# Patient Record
Sex: FEMALE | Race: WHITE | Employment: UNEMPLOYED | URBAN - METROPOLITAN AREA
[De-identification: names, ages, dates, MRNs, and addresses within clinical notes are randomized per-mention and may not be internally consistent; named-entity substitution may affect disease eponyms.]

---

## 2017-01-16 RX ORDER — ATORVASTATIN CALCIUM 20 MG/1
TABLET, FILM COATED ORAL
Qty: 90 TABLET | Refills: 1 | Status: ON HOLD | OUTPATIENT
Start: 2017-01-16 | End: 2020-01-01

## 2018-03-08 ENCOUNTER — HOSPITAL ENCOUNTER (OUTPATIENT)
Dept: SPEECH THERAPY | Age: 63
Setting detail: THERAPIES SERIES
Discharge: HOME | End: 2018-03-08

## 2020-01-01 ENCOUNTER — APPOINTMENT (OUTPATIENT)
Dept: CT IMAGING | Age: 65
End: 2020-01-01
Payer: MEDICARE

## 2020-01-01 ENCOUNTER — VIRTUAL VISIT (OUTPATIENT)
Dept: NEUROLOGY | Age: 65
End: 2020-01-01
Payer: MEDICARE

## 2020-01-01 ENCOUNTER — TELEPHONE (OUTPATIENT)
Dept: NEUROLOGY | Age: 65
End: 2020-01-01

## 2020-01-01 ENCOUNTER — APPOINTMENT (OUTPATIENT)
Dept: GENERAL RADIOLOGY | Age: 65
DRG: 100 | End: 2020-01-01
Payer: MEDICARE

## 2020-01-01 ENCOUNTER — OFFICE VISIT (OUTPATIENT)
Dept: NEUROLOGY | Age: 65
End: 2020-01-01
Payer: MEDICARE

## 2020-01-01 ENCOUNTER — HOSPITAL ENCOUNTER (EMERGENCY)
Age: 65
Discharge: HOME | End: 2020-10-06
Payer: MEDICARE

## 2020-01-01 ENCOUNTER — HOSPITAL ENCOUNTER (EMERGENCY)
Age: 65
Discharge: HOME | End: 2020-06-03
Payer: MEDICARE

## 2020-01-01 ENCOUNTER — HOSPITAL ENCOUNTER (INPATIENT)
Age: 65
LOS: 1 days | Discharge: HOME | DRG: 100 | End: 2020-03-10
Attending: INTERNAL MEDICINE | Admitting: INTERNAL MEDICINE
Payer: MEDICARE

## 2020-01-01 ENCOUNTER — APPOINTMENT (OUTPATIENT)
Dept: MRI IMAGING | Age: 65
DRG: 100 | End: 2020-01-01
Payer: MEDICARE

## 2020-01-01 ENCOUNTER — APPOINTMENT (OUTPATIENT)
Dept: CT IMAGING | Age: 65
DRG: 100 | End: 2020-01-01
Payer: MEDICARE

## 2020-01-01 VITALS
BODY MASS INDEX: 38.57 KG/M2 | DIASTOLIC BLOOD PRESSURE: 1 MMHG | TEMPERATURE: 97.5 F | HEIGHT: 66 IN | HEART RATE: 88 BPM | OXYGEN SATURATION: 98 % | RESPIRATION RATE: 17 BRPM | SYSTOLIC BLOOD PRESSURE: 168 MMHG | WEIGHT: 240 LBS

## 2020-01-01 VITALS
BODY MASS INDEX: 38.89 KG/M2 | OXYGEN SATURATION: 100 % | HEIGHT: 66 IN | WEIGHT: 242 LBS | RESPIRATION RATE: 18 BRPM | SYSTOLIC BLOOD PRESSURE: 125 MMHG | HEART RATE: 54 BPM | TEMPERATURE: 98.4 F | DIASTOLIC BLOOD PRESSURE: 86 MMHG

## 2020-01-01 VITALS — HEART RATE: 93 BPM | SYSTOLIC BLOOD PRESSURE: 141 MMHG | DIASTOLIC BLOOD PRESSURE: 116 MMHG

## 2020-01-01 VITALS
BODY MASS INDEX: 38.89 KG/M2 | HEART RATE: 69 BPM | OXYGEN SATURATION: 97 % | DIASTOLIC BLOOD PRESSURE: 68 MMHG | WEIGHT: 242 LBS | TEMPERATURE: 99.1 F | SYSTOLIC BLOOD PRESSURE: 134 MMHG | HEIGHT: 66 IN | RESPIRATION RATE: 16 BRPM

## 2020-01-01 LAB
ALBUMIN SERPL-MCNC: 3.7 G/DL (ref 3.5–4.6)
ALBUMIN SERPL-MCNC: 3.7 G/DL (ref 3.5–4.6)
ALBUMIN SERPL-MCNC: 4 G/DL (ref 3.5–4.6)
ALP BLD-CCNC: 77 U/L (ref 40–130)
ALP BLD-CCNC: 83 U/L (ref 40–130)
ALP BLD-CCNC: 83 U/L (ref 40–130)
ALT SERPL-CCNC: 12 U/L (ref 0–33)
ALT SERPL-CCNC: 15 U/L (ref 0–33)
ALT SERPL-CCNC: 15 U/L (ref 0–33)
ANION GAP SERPL CALCULATED.3IONS-SCNC: 11 MEQ/L (ref 9–15)
ANION GAP SERPL CALCULATED.3IONS-SCNC: 11 MEQ/L (ref 9–15)
ANION GAP SERPL CALCULATED.3IONS-SCNC: 13 MEQ/L (ref 9–15)
ANION GAP SERPL CALCULATED.3IONS-SCNC: 17 MEQ/L (ref 9–15)
AST SERPL-CCNC: 15 U/L (ref 0–35)
AST SERPL-CCNC: 16 U/L (ref 0–35)
AST SERPL-CCNC: 17 U/L (ref 0–35)
BACTERIA: NEGATIVE /HPF
BASOPHILS ABSOLUTE: 0 K/UL (ref 0–0.2)
BASOPHILS ABSOLUTE: 0.1 K/UL (ref 0–0.2)
BASOPHILS RELATIVE PERCENT: 0.4 %
BASOPHILS RELATIVE PERCENT: 0.6 %
BASOPHILS RELATIVE PERCENT: 0.8 %
BASOPHILS RELATIVE PERCENT: 1.3 %
BILIRUB SERPL-MCNC: 0.3 MG/DL (ref 0.2–0.7)
BILIRUB SERPL-MCNC: 0.3 MG/DL (ref 0.2–0.7)
BILIRUB SERPL-MCNC: 0.4 MG/DL (ref 0.2–0.7)
BILIRUBIN URINE: NEGATIVE
BLOOD, URINE: ABNORMAL
BUN BLDV-MCNC: 12 MG/DL (ref 8–23)
BUN BLDV-MCNC: 12 MG/DL (ref 8–23)
BUN BLDV-MCNC: 15 MG/DL (ref 8–23)
BUN BLDV-MCNC: 9 MG/DL (ref 8–23)
CALCIUM SERPL-MCNC: 8.3 MG/DL (ref 8.5–9.9)
CALCIUM SERPL-MCNC: 8.6 MG/DL (ref 8.5–9.9)
CALCIUM SERPL-MCNC: 8.7 MG/DL (ref 8.5–9.9)
CALCIUM SERPL-MCNC: 9.1 MG/DL (ref 8.5–9.9)
CHLORIDE BLD-SCNC: 102 MEQ/L (ref 95–107)
CHLORIDE BLD-SCNC: 105 MEQ/L (ref 95–107)
CHLORIDE BLD-SCNC: 107 MEQ/L (ref 95–107)
CHLORIDE BLD-SCNC: 108 MEQ/L (ref 95–107)
CLARITY: CLEAR
CO2: 18 MEQ/L (ref 20–31)
CO2: 20 MEQ/L (ref 20–31)
CO2: 23 MEQ/L (ref 20–31)
CO2: 26 MEQ/L (ref 20–31)
COLOR: YELLOW
CREAT SERPL-MCNC: 0.61 MG/DL (ref 0.5–0.9)
CREAT SERPL-MCNC: 0.71 MG/DL (ref 0.5–0.9)
CREAT SERPL-MCNC: 0.76 MG/DL (ref 0.5–0.9)
CREAT SERPL-MCNC: 0.78 MG/DL (ref 0.5–0.9)
EKG ATRIAL RATE: 70 BPM
EKG P AXIS: 19 DEGREES
EKG P-R INTERVAL: 172 MS
EKG Q-T INTERVAL: 434 MS
EKG QRS DURATION: 78 MS
EKG QTC CALCULATION (BAZETT): 468 MS
EKG R AXIS: 0 DEGREES
EKG T AXIS: 8 DEGREES
EKG VENTRICULAR RATE: 70 BPM
EOSINOPHILS ABSOLUTE: 0.1 K/UL (ref 0–0.7)
EOSINOPHILS RELATIVE PERCENT: 0.5 %
EOSINOPHILS RELATIVE PERCENT: 1.4 %
EOSINOPHILS RELATIVE PERCENT: 1.4 %
EOSINOPHILS RELATIVE PERCENT: 1.7 %
EPITHELIAL CELLS, UA: NORMAL /HPF (ref 0–5)
GFR AFRICAN AMERICAN: >60
GFR NON-AFRICAN AMERICAN: >60
GLOBULIN: 2.7 G/DL (ref 2.3–3.5)
GLOBULIN: 2.7 G/DL (ref 2.3–3.5)
GLOBULIN: 3 G/DL (ref 2.3–3.5)
GLUCOSE BLD-MCNC: 103 MG/DL (ref 70–99)
GLUCOSE BLD-MCNC: 117 MG/DL (ref 70–99)
GLUCOSE BLD-MCNC: 92 MG/DL (ref 70–99)
GLUCOSE BLD-MCNC: 96 MG/DL (ref 70–99)
GLUCOSE URINE: NEGATIVE MG/DL
HCT VFR BLD CALC: 36.4 % (ref 37–47)
HCT VFR BLD CALC: 43.4 % (ref 37–47)
HCT VFR BLD CALC: 43.6 % (ref 37–47)
HCT VFR BLD CALC: 44.6 % (ref 37–47)
HEMOGLOBIN: 12.4 G/DL (ref 12–16)
HEMOGLOBIN: 14.5 G/DL (ref 12–16)
HEMOGLOBIN: 14.7 G/DL (ref 12–16)
HEMOGLOBIN: 15 G/DL (ref 12–16)
KETONES, URINE: ABNORMAL MG/DL
LACTIC ACID: 2.2 MMOL/L (ref 0.5–2.2)
LACTIC ACID: 6.2 MMOL/L (ref 0.5–2.2)
LEUKOCYTE ESTERASE, URINE: NEGATIVE
LYMPHOCYTES ABSOLUTE: 1.5 K/UL (ref 1–4.8)
LYMPHOCYTES ABSOLUTE: 2 K/UL (ref 1–4.8)
LYMPHOCYTES ABSOLUTE: 2.2 K/UL (ref 1–4.8)
LYMPHOCYTES ABSOLUTE: 2.4 K/UL (ref 1–4.8)
LYMPHOCYTES RELATIVE PERCENT: 21 %
LYMPHOCYTES RELATIVE PERCENT: 22.3 %
LYMPHOCYTES RELATIVE PERCENT: 27.8 %
LYMPHOCYTES RELATIVE PERCENT: 34.4 %
MAGNESIUM: 2.1 MG/DL (ref 1.7–2.4)
MCH RBC QN AUTO: 30.4 PG (ref 27–31.3)
MCH RBC QN AUTO: 30.4 PG (ref 27–31.3)
MCH RBC QN AUTO: 31.4 PG (ref 27–31.3)
MCH RBC QN AUTO: 31.6 PG (ref 27–31.3)
MCHC RBC AUTO-ENTMCNC: 33.3 % (ref 33–37)
MCHC RBC AUTO-ENTMCNC: 33.6 % (ref 33–37)
MCHC RBC AUTO-ENTMCNC: 33.7 % (ref 33–37)
MCHC RBC AUTO-ENTMCNC: 34 % (ref 33–37)
MCV RBC AUTO: 90.2 FL (ref 82–100)
MCV RBC AUTO: 90.5 FL (ref 82–100)
MCV RBC AUTO: 93 FL (ref 82–100)
MCV RBC AUTO: 94.3 FL (ref 82–100)
MONOCYTES ABSOLUTE: 0.5 K/UL (ref 0.2–0.8)
MONOCYTES ABSOLUTE: 0.5 K/UL (ref 0.2–0.8)
MONOCYTES ABSOLUTE: 0.6 K/UL (ref 0.2–0.8)
MONOCYTES ABSOLUTE: 0.9 K/UL (ref 0.2–0.8)
MONOCYTES RELATIVE PERCENT: 6 %
MONOCYTES RELATIVE PERCENT: 8.1 %
MONOCYTES RELATIVE PERCENT: 9 %
MONOCYTES RELATIVE PERCENT: 9.2 %
MUCUS: PRESENT /LPF
NEUTROPHILS ABSOLUTE: 3.2 K/UL (ref 1.4–6.5)
NEUTROPHILS ABSOLUTE: 4.6 K/UL (ref 1.4–6.5)
NEUTROPHILS ABSOLUTE: 5.7 K/UL (ref 1.4–6.5)
NEUTROPHILS ABSOLUTE: 7.2 K/UL (ref 1.4–6.5)
NEUTROPHILS RELATIVE PERCENT: 54.5 %
NEUTROPHILS RELATIVE PERCENT: 64.4 %
NEUTROPHILS RELATIVE PERCENT: 66.3 %
NEUTROPHILS RELATIVE PERCENT: 68.9 %
NITRITE, URINE: NEGATIVE
PDW BLD-RTO: 12.6 % (ref 11.5–14.5)
PDW BLD-RTO: 13 % (ref 11.5–14.5)
PDW BLD-RTO: 13.2 % (ref 11.5–14.5)
PDW BLD-RTO: 13.7 % (ref 11.5–14.5)
PH UA: 5 (ref 5–9)
PLATELET # BLD: 216 K/UL (ref 130–400)
PLATELET # BLD: 258 K/UL (ref 130–400)
PLATELET # BLD: 259 K/UL (ref 130–400)
PLATELET # BLD: 283 K/UL (ref 130–400)
POTASSIUM SERPL-SCNC: 3.6 MEQ/L (ref 3.4–4.9)
POTASSIUM SERPL-SCNC: 3.7 MEQ/L (ref 3.4–4.9)
POTASSIUM SERPL-SCNC: 3.7 MEQ/L (ref 3.4–4.9)
POTASSIUM SERPL-SCNC: 4 MEQ/L (ref 3.4–4.9)
PROTEIN UA: ABNORMAL MG/DL
RBC # BLD: 3.91 M/UL (ref 4.2–5.4)
RBC # BLD: 4.61 M/UL (ref 4.2–5.4)
RBC # BLD: 4.83 M/UL (ref 4.2–5.4)
RBC # BLD: 4.93 M/UL (ref 4.2–5.4)
RBC UA: NORMAL /HPF (ref 0–5)
SODIUM BLD-SCNC: 139 MEQ/L (ref 135–144)
SODIUM BLD-SCNC: 139 MEQ/L (ref 135–144)
SODIUM BLD-SCNC: 140 MEQ/L (ref 135–144)
SODIUM BLD-SCNC: 143 MEQ/L (ref 135–144)
SPECIFIC GRAVITY UA: 1.02 (ref 1–1.03)
T4 FREE: 0.97 NG/DL (ref 0.84–1.68)
TOTAL CK: 76 U/L (ref 0–170)
TOTAL PROTEIN: 6.4 G/DL (ref 6.3–8)
TOTAL PROTEIN: 6.7 G/DL (ref 6.3–8)
TOTAL PROTEIN: 6.7 G/DL (ref 6.3–8)
TROPONIN: <0.01 NG/ML (ref 0–0.01)
TSH REFLEX: 8.27 UIU/ML (ref 0.44–3.86)
URINE CULTURE, ROUTINE: NORMAL
URINE REFLEX TO CULTURE: YES
UROBILINOGEN, URINE: 0.2 E.U./DL
WBC # BLD: 10.4 K/UL (ref 4.8–10.8)
WBC # BLD: 5.8 K/UL (ref 4.8–10.8)
WBC # BLD: 6.9 K/UL (ref 4.8–10.8)
WBC # BLD: 8.8 K/UL (ref 4.8–10.8)
WBC UA: NORMAL /HPF (ref 0–5)

## 2020-01-01 PROCEDURE — 93010 ELECTROCARDIOGRAM REPORT: CPT | Performed by: INTERNAL MEDICINE

## 2020-01-01 PROCEDURE — 99285 EMERGENCY DEPT VISIT HI MDM: CPT

## 2020-01-01 PROCEDURE — 93005 ELECTROCARDIOGRAM TRACING: CPT | Performed by: NURSE PRACTITIONER

## 2020-01-01 PROCEDURE — 96376 TX/PRO/DX INJ SAME DRUG ADON: CPT

## 2020-01-01 PROCEDURE — 1210000000 HC MED SURG R&B

## 2020-01-01 PROCEDURE — 96365 THER/PROPH/DIAG IV INF INIT: CPT

## 2020-01-01 PROCEDURE — 80053 COMPREHEN METABOLIC PANEL: CPT

## 2020-01-01 PROCEDURE — 6360000002 HC RX W HCPCS: Performed by: PSYCHIATRY & NEUROLOGY

## 2020-01-01 PROCEDURE — 85025 COMPLETE CBC W/AUTO DIFF WBC: CPT

## 2020-01-01 PROCEDURE — 95816 EEG AWAKE AND DROWSY: CPT | Performed by: PSYCHIATRY & NEUROLOGY

## 2020-01-01 PROCEDURE — 83605 ASSAY OF LACTIC ACID: CPT

## 2020-01-01 PROCEDURE — 97162 PT EVAL MOD COMPLEX 30 MIN: CPT

## 2020-01-01 PROCEDURE — 81001 URINALYSIS AUTO W/SCOPE: CPT

## 2020-01-01 PROCEDURE — 80048 BASIC METABOLIC PNL TOTAL CA: CPT

## 2020-01-01 PROCEDURE — 2580000003 HC RX 258: Performed by: INTERNAL MEDICINE

## 2020-01-01 PROCEDURE — 99233 SBSQ HOSP IP/OBS HIGH 50: CPT | Performed by: PSYCHIATRY & NEUROLOGY

## 2020-01-01 PROCEDURE — 84439 ASSAY OF FREE THYROXINE: CPT

## 2020-01-01 PROCEDURE — 84484 ASSAY OF TROPONIN QUANT: CPT

## 2020-01-01 PROCEDURE — 92610 EVALUATE SWALLOWING FUNCTION: CPT

## 2020-01-01 PROCEDURE — G0378 HOSPITAL OBSERVATION PER HR: HCPCS

## 2020-01-01 PROCEDURE — 36415 COLL VENOUS BLD VENIPUNCTURE: CPT

## 2020-01-01 PROCEDURE — 6370000000 HC RX 637 (ALT 250 FOR IP): Performed by: INTERNAL MEDICINE

## 2020-01-01 PROCEDURE — 2580000003 HC RX 258: Performed by: NURSE PRACTITIONER

## 2020-01-01 PROCEDURE — 99214 OFFICE O/P EST MOD 30 MIN: CPT | Performed by: NURSE PRACTITIONER

## 2020-01-01 PROCEDURE — 99282 EMERGENCY DEPT VISIT SF MDM: CPT

## 2020-01-01 PROCEDURE — 71045 X-RAY EXAM CHEST 1 VIEW: CPT

## 2020-01-01 PROCEDURE — 6360000002 HC RX W HCPCS: Performed by: NURSE PRACTITIONER

## 2020-01-01 PROCEDURE — 99214 OFFICE O/P EST MOD 30 MIN: CPT | Performed by: PSYCHIATRY & NEUROLOGY

## 2020-01-01 PROCEDURE — 70450 CT HEAD/BRAIN W/O DYE: CPT

## 2020-01-01 PROCEDURE — 96375 TX/PRO/DX INJ NEW DRUG ADDON: CPT

## 2020-01-01 PROCEDURE — 95816 EEG AWAKE AND DROWSY: CPT

## 2020-01-01 PROCEDURE — 97167 OT EVAL HIGH COMPLEX 60 MIN: CPT

## 2020-01-01 PROCEDURE — 6370000000 HC RX 637 (ALT 250 FOR IP): Performed by: PSYCHIATRY & NEUROLOGY

## 2020-01-01 PROCEDURE — 99222 1ST HOSP IP/OBS MODERATE 55: CPT | Performed by: PSYCHIATRY & NEUROLOGY

## 2020-01-01 PROCEDURE — 87086 URINE CULTURE/COLONY COUNT: CPT

## 2020-01-01 PROCEDURE — 99213 OFFICE O/P EST LOW 20 MIN: CPT | Performed by: NURSE PRACTITIONER

## 2020-01-01 PROCEDURE — 82550 ASSAY OF CK (CPK): CPT

## 2020-01-01 PROCEDURE — 96361 HYDRATE IV INFUSION ADD-ON: CPT

## 2020-01-01 PROCEDURE — 84443 ASSAY THYROID STIM HORMONE: CPT

## 2020-01-01 PROCEDURE — 6360000002 HC RX W HCPCS: Performed by: INTERNAL MEDICINE

## 2020-01-01 PROCEDURE — 96372 THER/PROPH/DIAG INJ SC/IM: CPT

## 2020-01-01 PROCEDURE — 83735 ASSAY OF MAGNESIUM: CPT

## 2020-01-01 RX ORDER — ACETAMINOPHEN 325 MG/1
650 TABLET ORAL EVERY 6 HOURS PRN
Status: DISCONTINUED | OUTPATIENT
Start: 2020-01-01 | End: 2020-01-01 | Stop reason: HOSPADM

## 2020-01-01 RX ORDER — PHENAZOPYRIDINE HYDROCHLORIDE 200 MG/1
200 TABLET, FILM COATED ORAL 3 TIMES DAILY PRN
Qty: 9 TABLET | Refills: 0 | Status: SHIPPED | OUTPATIENT
Start: 2020-01-01

## 2020-01-01 RX ORDER — LEVETIRACETAM 500 MG/1
500 TABLET ORAL 2 TIMES DAILY
Status: DISCONTINUED | OUTPATIENT
Start: 2020-01-01 | End: 2020-01-01 | Stop reason: HOSPADM

## 2020-01-01 RX ORDER — LEVETIRACETAM 500 MG/1
500 TABLET ORAL 2 TIMES DAILY
Qty: 60 TABLET | Refills: 1 | Status: SHIPPED | OUTPATIENT
Start: 2020-01-01 | End: 2020-01-01 | Stop reason: SDUPTHER

## 2020-01-01 RX ORDER — LORAZEPAM 2 MG/ML
1 INJECTION INTRAMUSCULAR EVERY 6 HOURS PRN
Status: DISCONTINUED | OUTPATIENT
Start: 2020-01-01 | End: 2020-01-01 | Stop reason: HOSPADM

## 2020-01-01 RX ORDER — HYDROXYZINE HYDROCHLORIDE 25 MG/1
25 TABLET, FILM COATED ORAL EVERY 6 HOURS PRN
Status: DISCONTINUED | OUTPATIENT
Start: 2020-01-01 | End: 2020-01-01 | Stop reason: HOSPADM

## 2020-01-01 RX ORDER — ACETAMINOPHEN 650 MG/1
650 SUPPOSITORY RECTAL EVERY 6 HOURS PRN
Status: DISCONTINUED | OUTPATIENT
Start: 2020-01-01 | End: 2020-01-01 | Stop reason: HOSPADM

## 2020-01-01 RX ORDER — SODIUM CHLORIDE 0.9 % (FLUSH) 0.9 %
10 SYRINGE (ML) INJECTION PRN
Status: DISCONTINUED | OUTPATIENT
Start: 2020-01-01 | End: 2020-01-01 | Stop reason: HOSPADM

## 2020-01-01 RX ORDER — LEVETIRACETAM 500 MG/1
500 TABLET ORAL 2 TIMES DAILY
Qty: 60 TABLET | Refills: 3 | Status: SHIPPED | OUTPATIENT
Start: 2020-01-01

## 2020-01-01 RX ORDER — SODIUM CHLORIDE 9 MG/ML
INJECTION, SOLUTION INTRAVENOUS CONTINUOUS
Status: DISCONTINUED | OUTPATIENT
Start: 2020-01-01 | End: 2020-01-01

## 2020-01-01 RX ORDER — LEVETIRACETAM 500 MG/1
500 TABLET ORAL 2 TIMES DAILY
Qty: 60 TABLET | Refills: 3 | Status: SHIPPED | OUTPATIENT
Start: 2020-01-01 | End: 2020-01-01 | Stop reason: SDUPTHER

## 2020-01-01 RX ORDER — POLYETHYLENE GLYCOL 3350 17 G/17G
17 POWDER, FOR SOLUTION ORAL DAILY PRN
Status: DISCONTINUED | OUTPATIENT
Start: 2020-01-01 | End: 2020-01-01 | Stop reason: HOSPADM

## 2020-01-01 RX ORDER — LABETALOL 20 MG/4 ML (5 MG/ML) INTRAVENOUS SYRINGE
10 EVERY 6 HOURS PRN
Status: DISCONTINUED | OUTPATIENT
Start: 2020-01-01 | End: 2020-01-01 | Stop reason: HOSPADM

## 2020-01-01 RX ORDER — DIVALPROEX SODIUM 125 MG/1
500 TABLET, DELAYED RELEASE ORAL 2 TIMES DAILY
Qty: 60 TABLET | Refills: 3 | Status: SHIPPED | OUTPATIENT
Start: 2020-01-01 | End: 2020-01-01

## 2020-01-01 RX ORDER — LORAZEPAM 2 MG/ML
2 INJECTION INTRAMUSCULAR EVERY 4 HOURS PRN
Status: DISCONTINUED | OUTPATIENT
Start: 2020-01-01 | End: 2020-01-01 | Stop reason: HOSPADM

## 2020-01-01 RX ORDER — LORAZEPAM 2 MG/ML
1 INJECTION INTRAMUSCULAR
Status: COMPLETED | OUTPATIENT
Start: 2020-01-01 | End: 2020-01-01

## 2020-01-01 RX ORDER — NYSTATIN 100000 [USP'U]/G
POWDER TOPICAL
Qty: 1 BOTTLE | Refills: 0 | Status: SHIPPED | OUTPATIENT
Start: 2020-01-01

## 2020-01-01 RX ORDER — SODIUM CHLORIDE 0.9 % (FLUSH) 0.9 %
10 SYRINGE (ML) INJECTION EVERY 12 HOURS SCHEDULED
Status: DISCONTINUED | OUTPATIENT
Start: 2020-01-01 | End: 2020-01-01 | Stop reason: HOSPADM

## 2020-01-01 RX ORDER — SULFAMETHOXAZOLE AND TRIMETHOPRIM 800; 160 MG/1; MG/1
1 TABLET ORAL 2 TIMES DAILY
Qty: 14 TABLET | Refills: 0 | Status: SHIPPED | OUTPATIENT
Start: 2020-01-01 | End: 2020-01-01

## 2020-01-01 RX ORDER — POLYETHYLENE GLYCOL 3350 17 G/17G
17 POWDER, FOR SOLUTION ORAL DAILY
COMMUNITY

## 2020-01-01 RX ORDER — OXCARBAZEPINE 300 MG/1
300 TABLET, FILM COATED ORAL 2 TIMES DAILY
Qty: 60 TABLET | Refills: 3 | Status: SHIPPED | OUTPATIENT
Start: 2020-01-01 | End: 2020-01-01

## 2020-01-01 RX ORDER — DIPHENHYDRAMINE HCL 25 MG
25 TABLET ORAL ONCE
Status: COMPLETED | OUTPATIENT
Start: 2020-01-01 | End: 2020-01-01

## 2020-01-01 RX ADMIN — LEVETIRACETAM 500 MG: 100 INJECTION, SOLUTION INTRAVENOUS at 23:17

## 2020-01-01 RX ADMIN — LEVETIRACETAM 500 MG: 500 TABLET ORAL at 10:48

## 2020-01-01 RX ADMIN — DIPHENHYDRAMINE HCL 25 MG: 25 TABLET ORAL at 23:17

## 2020-01-01 RX ADMIN — LEVETIRACETAM 1000 MG: 100 INJECTION, SOLUTION INTRAVENOUS at 11:47

## 2020-01-01 RX ADMIN — ENOXAPARIN SODIUM 40 MG: 40 INJECTION SUBCUTANEOUS at 16:23

## 2020-01-01 RX ADMIN — LORAZEPAM 1 MG: 2 INJECTION INTRAMUSCULAR; INTRAVENOUS at 20:03

## 2020-01-01 RX ADMIN — Medication 10 ML: at 10:49

## 2020-01-01 RX ADMIN — SODIUM CHLORIDE 75 ML/HR: 9 INJECTION, SOLUTION INTRAVENOUS at 16:23

## 2020-01-01 RX ADMIN — SODIUM CHLORIDE: 9 INJECTION, SOLUTION INTRAVENOUS at 06:19

## 2020-01-01 ASSESSMENT — ENCOUNTER SYMPTOMS
COLOR CHANGE: 0
TROUBLE SWALLOWING: 0
WHEEZING: 0
WHEEZING: 0
CHOKING: 0
TROUBLE SWALLOWING: 0
RHINORRHEA: 0
SHORTNESS OF BREATH: 0
TROUBLE SWALLOWING: 0
VOMITING: 0
DIARRHEA: 0
COLOR CHANGE: 0
SORE THROAT: 0
COUGH: 0
COUGH: 0
NAUSEA: 0
CONSTIPATION: 0
EYE REDNESS: 0
COUGH: 0
PHOTOPHOBIA: 0
COLOR CHANGE: 0
WHEEZING: 0
TROUBLE SWALLOWING: 0
BACK PAIN: 0
COUGH: 0
EYE PAIN: 0
EYE DISCHARGE: 0
BLOOD IN STOOL: 0
SHORTNESS OF BREATH: 0
VOMITING: 0
BACK PAIN: 0
NAUSEA: 0
VOMITING: 0
ABDOMINAL PAIN: 0
WHEEZING: 0

## 2020-01-01 ASSESSMENT — PAIN SCALES - WONG BAKER: WONGBAKER_NUMERICALRESPONSE: 4

## 2020-01-01 ASSESSMENT — PAIN SCALES - GENERAL
PAINLEVEL_OUTOF10: 0
PAINLEVEL_OUTOF10: 0

## 2020-03-09 PROBLEM — R56.9 SEIZURE (HCC): Status: ACTIVE | Noted: 2020-01-01

## 2020-03-09 NOTE — CARE COORDINATION
[facility] Case Management Initial Discharge Assessment    Met with Family to discuss discharge plan. PCP: WARREN Adames NP                                Date of Last Visit: 1 month ago    If no PCP, list provided? N/A    Discharge Planning    Living Arrangements: at home dependent on family care    Who do you live with?     Who helps you with your care:  spouse    If lives at home:     Do you have any barriers navigating in your home? no    Patient can perform ADL? No    Current Services (outpatient and in home) :  None    Dialysis: No    Is transportation available to get to your appointments? Yes    DME Equipment:  no    Respiratory equipment: None    Respiratory provider:  no     [facility]    Consult with Medication Assistance Program?  No        Does Patient Have a High-Risk for Readmission Diagnosis (CHF, PN, MI, COPD)? no      Initial Discharge Plan? (Note: please see concurrent daily documentation for any updates after initial note). CM to follow for further d/c needs and referrals.     Electronically signed by Aiden Lieberman on 3/9/2020 at 1:43 PM

## 2020-03-09 NOTE — ED NOTES
Pt becoming more responsive per  pt nodding more appropriately. Pt appears more alert since arrival.     Caitlin Mena.  Vida Felt  03/09/20 3309

## 2020-03-09 NOTE — ED NOTES
Bed: 20  Expected date: 3/9/20  Expected time: 10:49 AM  Means of arrival: [facility]  Comments:  60 F - possible BAT. Unresponsive per family. Now responding. Hx dementia and TIAs. 220/134, 86.  OT 1100 [facility], RN  03/09/20 0504

## 2020-03-09 NOTE — ED TRIAGE NOTES
Pt arrived via ems from home with complaint of altered mental status. On arrival pt was awake non verbal hx of dementia. Pt with dried blood on left side of face from mouth. Pt does not follow commands. Peer ems pt was watching tv and let out a loud scream and would not respond to . Per , after scream pt stiffened with arms outstretched which lasted just a few seconds. Per , pt has limited speech. Pt incontinent of urine.  per ems.

## 2020-03-09 NOTE — ED PROVIDER NOTES
Occupational History    None   Social Needs    Financial resource strain: None    Food insecurity     Worry: None     Inability: None    Transportation needs     Medical: None     Non-medical: None   Tobacco Use    Smoking status: Never Smoker   Substance and Sexual Activity    Alcohol use: No     Alcohol/week: 0.0 standard drinks    Drug use: None    Sexual activity: None   Lifestyle    Physical activity     Days per week: None     Minutes per session: None    Stress: None   Relationships    Social connections     Talks on phone: None     Gets together: None     Attends Mandaen service: None     Active member of club or organization: None     Attends meetings of clubs or organizations: None     Relationship status: None    Intimate partner violence     Fear of current or ex partner: None     Emotionally abused: None     Physically abused: None     Forced sexual activity: None   Other Topics Concern    None   Social History Narrative    None       SCREENINGS   NIH Stroke Scale  Level of Consciousness (1a. ): Alert  LOC Questions (1b. ): (same as arrival)  LOC Commands (1c. ): (same as arrival)  Best Gaze (2. ): Normal  Visual (3. ): (unable to assess)  Facial Palsy (4. ): Normal symmetrical movement  Motor Arm, Left (5a. ): Some effort against gravity  Motor Arm, Right (5b. ): Some effort against gravity  Motor Leg, Left (6a. ): Some effort against gravity  Motor Leg, Right (6b. ): Some effort against gravity  Limb Ataxia (7. ): (unable to assess)  Sensory (8. ): (unable to assess)  Best Language (9. ): (pt says \"yeah\"now)  Dysarthria (10. ): (unable to assess)  Extinction and Inattention (11): (pt unable to follow)[place] Coma Scale  Eye Opening: Spontaneous  Best Verbal Response: None(pt non verbal)  Best Motor Response: Localizes pain  [place] Coma Scale Score: 10        PHYSICAL EXAM    (up to 7 for level 4, 8 or more for level 5)     ED Triage Vitals   BP Temp Temp src Pulse Resp SpO2 Height She does not appear toxic or distressed. Does not follow commands but moves from painful stimulus. Exam and hx concerning for seizure type activity. NIH difficult to evaluate due to pt nonverbal with hx of same and not following commands. Will obtain labs/rads for evaluation of acute pathology. 1109- reexamined. Now with some attempts to verbally communicate. Cannot understand her but  reports that she is back to baseline, again he notes severe dementia. Return to baseline. Labs/rads noted. Discussed case with neuro, ok with keppra. Needs admit to evaluate seizure activity. Will d/w hospitalist.     CRITICAL CARE TIME         CONSULTS:  IP CONSULT TO HOSPITALIST  IP CONSULT TO NEUROLOGY    PROCEDURES:  Unless otherwise noted below, none     Procedures    FINAL IMPRESSION      1. Seizure ([address]          DISPOSITION/PLAN   DISPOSITION        PATIENT REFERRED TO:  No follow-up provider specified.     DISCHARGE MEDICATIONS:  New Prescriptions    No medications on file          (Please notethat portions of this note were completed with a voice recognition program.  Efforts were made to edit the dictations but occasionally words are mis-transcribed.)    WARREN Mistry CNP (electronically signed)  Attending Emergency Physician          WARREN Mistry CNP  03/09/20 8568

## 2020-03-09 NOTE — ED NOTES
Pt to CT via ED tech after blood draw. Deepak Major [facility]  03/09/20 111

## 2020-03-09 NOTE — H&P
Hospital Medicine  History and Physical    Patient:  Gio Menchaca  MRN: [id number]    CHIEF COMPLAINT:    Chief Complaint   Patient presents with    Altered Mental Status     since about 1015       History Obtained From:  Patient, EMR  Primary Care Physician: WARREN Greenberg - TIFFANIE    HISTORY OF PRESENT ILLNESS:   The patient is a 60 y.o. female with PMH of severe dementia, history of multiple CVAs, depression who presents to ED with altered mental status. Patient is nonverbal at baseline and therefore the history is provided by her  who is at bedside. Patient apparently was watching TV this morning when  said she began to stretch and then made a funny noise. She then began screaming and her muscles \"froze,\" and she was shaking with arms above her head. Per , this has never happened before. After 1-2 minutes of this she became limp and  was unable to awaken her. EMS was called. Patient had urinated on herself and bit her tongue. En route to ED, she began to return to her baseline, which is still minimally interactive. She was loaded with [employer]ra and neurology was contacted. She will be admitted to [facility]. Past Medical History:      Diagnosis Date    Dementia ([facility] [address]     Depression     History of TIAs 2013    Hyperlipidemia        Past Surgical History:  History reviewed. No pertinent surgical history. Medications Prior to Admission:    Prior to Admission medications    Medication Sig Start Date End Date Taking? Authorizing Provider   atorvastatin (LIPITOR) 20 MG tablet Take 1 tablet by mouth daily 1/16/17   Humberto Wright MD   NAMENDA XR 14 MG CP24 capsule  12/14/15   Historical Provider, MD   sertraline (ZOLOFT) 25 MG tablet  12/4/15   Historical Provider, MD   Multiple Vitamins-Minerals (MULTIVITAMIN GUMMIES ADULT PO) Take by mouth    Historical Provider, MD       Allergies:  Radha Stratton hcl]    Social History:   TOBACCO:   reports that she has never smoked.  She does not have any smokeless tobacco history on file. ETOH:   reports no history of alcohol use. Family History:       Family history unknown: Yes       REVIEW OF SYSTEMS:  Ten systems reviewed and negative except for stated in HPI    Physical Exam:    Vitals: /85   Pulse 55   Temp 97.3 °F (36.3 °C) (Temporal)   Resp 23   Ht 5' 6\" (1.676 m)   Wt 242 lb (109.8 kg)   LMP  (LMP Unknown)   SpO2 93%   BMI 39.06 kg/m²   General appearance: alert, appears stated age and cooperative, moderate acute distress  Skin: Skin color, texture, turgor normal. No rashes or lesions  HEENT: Head: Normal, normocephalic, atraumatic. Neck: no carotid bruit and supple, symmetrical, trachea midline  Lungs: clear to auscultation bilaterally  Heart: regular rate and rhythm, S1, S2 normal, no murmur, click, rub or gallop  Abdomen: soft, non-tender; bowel sounds normal; no masses,  no organomegaly  Extremities: extremities normal, atraumatic, no cyanosis or edema  Neurologic: Mental status: Alert. Does not follow commands (baseline)/ cooperate with neurologic exam.     Recent Labs     03/09/20  1112   WBC 8.8   HGB 14.5        Recent Labs     03/09/20  1112      K 4.0   *   CO2 18*   BUN 12   CREATININE 0.71   GLUCOSE 117*   AST 17   ALT 15   BILITOT 0.3   ALKPHOS 77     Troponin T:   Recent Labs     03/09/20  1112   TROPONINI <0.010       ABGs: No results found for: PHART, PO2ART, AOI4EZR  INR: No results for input(s): INR in the last 72 hours.   URINALYSIS:  Recent Labs     03/09/20  1100 03/09/20  1242   COLORU Yellow  --    PHUR 5.0  --    WBCUA  --  0-2   RBCUA  --  0-2   MUCUS  --  Present   BACTERIA  --  Negative   CLARITYU Clear  --    SPECGRAV 1.023  --    LEUKOCYTESUR Negative  --    UROBILINOGEN 0.2  --    BILIRUBINUR Negative  --    BLOODU TRACE*  --    GLUCOSEU Negative  --      -----------------------------------------------------------------   Ct Head Wo Contrast    Result Date: 3/9/2020  CT HEAD WO CONTRAST CLINICAL HISTORY: Change in mental status COMPARISON: None TECHNIQUE: Multiple unenhanced serial axial images of the brain from the vertex of the skull to the base of the skull were performed. FINDINGS: The ventricles are dilated. This is compensatory to the surrounding moderate generalized parenchymal volume loss. No mass. No midline shift. The cisterns are patent. There are white matter and periventricular changes most likely consistent with chronic small vessel disease. No acute intra-axial or extra-axial findings. The visualized osseous structures are unremarkable. There is mucoperiosteal thickening of the right maxillary sinus. No air-fluid levels. The mastoids are well aerated. NO ACUTE INTRA-AXIAL OR EXTRA-AXIAL FINDINGS. All CT scans at this facility use dose modulation, iterative reconstruction, and/or weight based dosing when appropriate to reduce radiation dose to as low as reasonably achievable. Assessment and Plan     1. Seizure: High suspicion for tonic clonic activity based on 's description. CT brain negative for acute process. 2. History of stroke/TIA  3. History of severe dementia     Plan:  - Consult to neurology  - Keppra loading per ED  - Continue IV Keppra BID  - EEG pending  - Ativan prn for additional tonic-clonic activity  - DNR-CCA per family   - Delirium protcol  - Gentle IVF  - Monitor on telemetry      Patient Active Problem List   Diagnosis Code    Hyperlipidemia E78.5    History of TIAs Z86.73    Seizure ([facility] [address] R56.9       Ml Cardenas DO  Admitting Hospitalist    TTS: 85mins where I focused more than 75% of my attention on rendering care, and planning treatment course for this patient, in addition to talking to RN team, mid levels, consulting with other physicians and following up on labs and imaging. High Risk Readmission Screening Tool Score Noted.      Emergency Contact:

## 2020-03-10 NOTE — PROGRESS NOTES
formally assess due to lack of pt compliance, however, RN notes that pt eats all foods at home per .)  Patient Positioning: Upright in bed  Baseline Vocal Quality: Normal(Limited speech output secondary to dementia and nonverbal status.)  Consistencies Administered: Reg solid; Thin - cup; Thin - straw(Pt refused puree consistencies.)    Current Diet level:  Current Diet : (Dental soft diet.)  Current Liquid Diet : Thin    Oral Motor Deficits  Oral/Motor  Oral Motor: (Unable to formally assess secondary to pt not following commands.)    Oral Phase Dysfunction  Oral Phase  Oral Phase: WFL  Oral Phase  Oral Phase - Comment: Oral phase of swallow WFL. Pt was able to form and clear a cohesive bolus. Limited trials were administered secondary to pt refusal and participation. Nurse reports that pt eats any foods she wants at home. Unable to assess for buccal pocketing and residue secondary to pt not following directives to \"open mouth\". No evidence of oral holding or impaired mastication was observed this date. Indicators of Pharyngeal Phase Dysfunction   Pharyngeal Phase  Pharyngeal Phase: WFL  Pharyngeal Phase   Pharyngeal: Pharyngeal phase of swallow WFL. Pt able to tolerate trials of thin liquids (cup and straw), and regular solid without any overt s/s of aspiration observed. Laryngeal elevation was present during examination. Impression  Dysphagia Diagnosis: Suspected needs further assessment;Swallow function appears grossly intact  Dysphagia Impression : At this time, pt swallow function appears [facility], however, further assessment is needed as pt is more cooperative with PO trials. Dysphagia Outcome Severity Scale: Level 6: Within functional limits/Modified independence     Treatment Plan  Requires SLP Intervention: Yes  Duration/Frequency of Treatment: 1x/wk for LOS or until all goals met.         Treatment/Goals  Short-term Goals  Timeframe for Short-term Goals: 1-2 weeks  Goal 1: Pt will tolerate soft and bite size consistencies with thin liquids in all given opportunities without adverse outcomes. Goal 2: Family will be educated on safe swallow strategies (small bites/sips, upright position, feed slowly) in order to decrease risk of aspiration. Long-term Goals  Timeframe for Long-term Goals: 1-2 weeks  Goal 1: Pt will tolerate recommended diet with no adverse outcomes. Prognosis  Prognosis  Prognosis for safe diet advancement: fair  Barriers to reach goals: age;cognitive deficits;language deficits  Barriers/Prognosis Comment: Pt has dementia. Individuals consulted  Consulted and agree with results and recommendations: RN(RN - Kate Matos)    Education  Patient Education: No education provided at this time.   Safety Devices in place: Yes  Type of devices: Call light within reach;Nurse notified    Pain:  Pain Assessment  Patient Currently in Pain: (Pt unable to state if she has pain.)       Therapy Time  SLP Individual Minutes  Time In: 1110  Time Out: 1120  Minutes: 10          Signature: Electronically signed by Krystina Esquivel SLP on 3/10/2020 at 11:42 AM

## 2020-03-10 NOTE — DISCHARGE SUMMARY
are mild degenerative changes in spine. Impression CARDIAC ENLARGEMENT WITH MILD CENTRAL VASCULAR CONGESTION. LEFT-SIDED PLEURAL EFFUSION. BIBASILAR ATELECTASIS. Echo No results found for this or any previous visit. Disposition: home    In process/preliminary results:  Outstanding Order Results     Date and Time Order Name Status Description    3/9/2020 1242 Culture, Urine Preliminary           Patient Instructions:   Current Discharge Medication List      START taking these medications    Details   levETIRAcetam (KEPPRA) 500 MG tablet Take 1 tablet by mouth 2 times daily  Qty: 60 tablet, Refills: 1      nystatin (MYCOSTATIN) 884228 UNIT/GM powder Apply 3 times daily. Qty: 1 Bottle, Refills: 0         CONTINUE these medications which have NOT CHANGED    Details   aspirin 81 MG tablet Take 81 mg by mouth daily      polyethylene glycol (GLYCOLAX) packet Take 17 g by mouth daily         STOP taking these medications       atorvastatin (LIPITOR) 20 MG tablet Comments:   Reason for Stopping:         NAMENDA XR 14 MG CP24 capsule Comments:   Reason for Stopping:         sertraline (ZOLOFT) 25 MG tablet Comments:   Reason for Stopping:         Multiple Vitamins-Minerals (MULTIVITAMIN GUMMIES ADULT PO) Comments:   Reason for Stopping:             Activity: As tolerated      Follow-up with PCP in 1 week. Follow-up with neurology in the next 2 to 4 weeks.     DC time 35 minutes    Signed:  Electronically signed by Deandre Michaels DO on 3/10/2020 at 11:25 AM

## 2020-03-10 NOTE — FLOWSHEET NOTE
At about 2230, perfect served the hospitalist regarding patient scratching at her chest and abdomen. Small scattered red dots noted on patient's chest. New order received for benadryl, patient received at about 2330 and it was ineffective. Also notified the hospitalist at about 2801 [facility] regarding the patient's elevated BP's. New order IV hydralazine received. Continue to monitor.

## 2020-03-10 NOTE — FLOWSHEET NOTE
Returned from MRI-unable to complete test, patient hit at self and trying to sit up in machine. Hospitalist notified at 2030. Assist of 2 to transfer back to bed, patient hitting at self, slapping at this nurse when attempting to apply tele. Bed alarm place on. Continue to monitor.

## 2020-03-10 NOTE — PROGRESS NOTES
[facility] OCCUPATIONAL THERAPY EVALUATION - ACUTE     NAME: Na Butcher  : 1955 (60 y.o.)  MRN: [id number]  CODE STATUS: DNR-CCA  Room: N227/N227-01    Date of Service: 3/10/2020    Patient Diagnosis(es): Seizure [facility]) [R56.9]   Chief Complaint   Patient presents with    Altered Mental Status     since about 21      Patient Active Problem List    Diagnosis Date Noted    Seizure [facility]) 2020    Hyperlipidemia     History of TIAs         Past Medical History:   Diagnosis Date    Dementia ([facility] [address]     Depression     History of TIAs     Hyperlipidemia      History reviewed. No pertinent surgical history. Restrictions  Restrictions/Precautions: Fall Risk     Safety Devices: Safety Devices  Safety Devices in place: Yes  Type of devices: All fall risk precautions in place    Subjective  Pre Treatment Pain Screening  Pain at present: 4  Scale Used: Faces  Intervention List: Patient able to continue with treatment    Pain Reassessment:   Pain Assessment  Patient Currently in Pain: Yes(Indicates pain with movement of either UE)  Pain Assessment: Faces  Bautista-Baker Pain Rating: Hurts little more       Prior Level of Function:  Social/Functional History  Lives With: Spouse  Type of Home: House  Home Layout: One level  Home Access: Level entry  Bathroom Shower/Tub: Tub/Shower unit  ADL Assistance: Needs assistance(Supervision from spouse)  Homemaking Assistance: Needs assistance  Ambulation Assistance: Needs assistance(Supervision from spouse, no AD)  Transfer Assistance: Needs assistance  Active : No  Patient's  Info: Spouse  Additional Comments: Spouse is at home at all times, provides assist PRN.  Pt. non-verbal, spouse provides info    OBJECTIVE:     Orientation Status:  Orientation  Overall Orientation Status: (Unable to assess)    Observation:  Observation/Palpation  Posture: Fair  Observation: Pt. up in wheelchair on therapist arrival. Pt. smiles pleasantly but does not attend to

## 2020-03-10 NOTE — PROGRESS NOTES
Physical Therapy Med Surg Initial Assessment  [facility]  Room: N227/N227-01       NAME: Daniel Mars  : 1955 (23 y.o.)  MRN: [id number]  CODE STATUS: DNR-CCA    Date of Service: 3/10/2020    Patient Diagnosis(es): Seizure [facility]) [R56.9]   Chief Complaint   Patient presents with    Altered Mental Status     since about 21      Patient Active Problem List    Diagnosis Date Noted    Seizure ([facility] [address] 2020    Hyperlipidemia     History of TIAs         Past Medical History:   Diagnosis Date    Dementia ([facility] [address]     Depression     History of TIAs     Hyperlipidemia      History reviewed. No pertinent surgical history. Chart Reviewed: Yes  Patient assessed for rehabilitation services?: Yes    Restrictions:  Restrictions/Precautions: Fall Risk     SUBJECTIVE:      Pain  Pre Treatment Pain Screening  Comments / Details: apparent no pain at rest    Post Treatment Pain Screening:   Pain Screening  Patient Currently in Pain: Yes(indicates pain with movement of her both UEs)  Pain Assessment  Pain Assessment: Faces    Prior Level of Function:  Social/Functional History  Lives With: Spouse  Type of Home: House  Home Layout: One level  Home Access: Level entry  Bathroom Shower/Tub: Tub/Shower unit  ADL Assistance: Needs assistance(Supervision from spouse)  Homemaking Assistance: Needs assistance  Ambulation Assistance: Needs assistance(Supervision from spouse, no AD)  Transfer Assistance: Needs assistance  Active : No  Patient's  Info: Spouse  Additional Comments: Spouse is at home at all times, provides assist PRN.  Pt. non-verbal, spouse provides info    OBJECTIVE:        Cognition:  Follows Commands: Impaired(follows tactile commands <25% of time, no response with verbal or visual cues)         Strength:  Strength RLE  Comment: grossly 4-/5  Strength LLE  Comment: grossly 4-/5         Bed mobility  Comment: DNT, pt dressed in her shoes and coat, sitting up in [facility] awaiting transport home upon PT arrival    Transfers  Sit to Stand: Moderate Assistance;2 Person Assistance(pt only able to stand with spouse providing max encouragement, 2 person assist to stand)  Stand to sit: Moderate Assistance;2 Person Assistance  Bed to Chair: Unable to assess(did not follow directions to complete a pivot transfer)  Comment: spouse attempted pulling pt from the lapels of her coat to stand- was educated in appropriate transfer technique using tactile and visual cueing to reduce caregiver burden and to improve pt safety. all education disregarded as spouse insisting his car was waiting and pt had to leave the floor    Ambulation  Ambulation?: Yes  Ambulation 1  Surface: level tile  Device: No Device  Assistance: Minimal assistance  Quality of Gait: staggering gait, poor postural control  Distance: takes about 5 steps before trying to sit back down in [facility], encouraged to try ambulating further with no success                              ASSESSMENT:   Body structures, Functions, Activity limitations: Decreased balance;Decreased functional mobility ; Decreased endurance;Decreased strength  Decision Making: Medium Complexity  History: high  Exam: medium  Clinical Presentation: medium  No Skilled PT: Patient refusal(would rec continued PT but pt already leaving hospital without ability to further educate or make recommendations)    Prognosis: Good  Barriers to Learning: unable to follow commands consistently, advanced dementia    DISCHARGE RECOMMENDATIONS:  No Skilled PT: Patient refusal(would rec continued PT but pt already leaving hospital without ability to further educate or make recommendations)    Assessment: Pt at risk for falls, demonstrates above deficits. Unable to make further recommendations as spouse already taking patient to car.      REQUIRES PT FOLLOW UP: No      PLAN OF CARE:  Safety Devices  Type of devices: (handoff of care to PCA and spouse to transport)    Goals:  Long term goals  Long term

## 2020-03-10 NOTE — CONSULTS
Subjective:      Patient ID: Prabhakar Avilez is a 60 y.o. female who presents today for:  Chief Complaint   Patient presents with    Altered Mental Status     since about 38       HPI 30-year-old right-handed female with a known history of vascular dementia since about 5 years. Patient was seen by us in October 2018. She has home care with her . She has significant sleep issues and depression. Patient was admitted with a generalized seizure. 2 days prior to this at night patient's  had her screech but he did not notice anything different though with this episode patient went into a tonic seizure. Lasting for maybe 3 to 5 minutes. Patient had a reaction to Namenda and we had recommended continuation of galantamine. She has multiple vascular issues but no risk factors for cerebrovascular disease have been identified she has been worked up in the past with this diagnosis. Patient was seen here with her  and she appeared to be improving and reaching baseline. She is able to eat she does not choke. She still walks around in the house but she is total care otherwise. Review of system is therefore not possible. Review of Systems   Unable to perform ROS: Dementia   Constitutional: Negative for fever. HENT: Negative for ear pain, tinnitus and trouble swallowing. Eyes: Negative for photophobia and visual disturbance. Respiratory: Negative for choking and shortness of breath. Cardiovascular: Negative for chest pain and palpitations. Gastrointestinal: Negative for nausea and vomiting. Musculoskeletal: Negative for back pain, gait problem, joint swelling, myalgias, neck pain and neck stiffness. Neurological: Negative for dizziness, tremors, seizures, syncope, facial asymmetry, speech difficulty, weakness, light-headedness, numbness and headaches. Psychiatric/Behavioral: Negative for behavioral problems, confusion, hallucinations and sleep disturbance.        Past Medical History:   Diagnosis Date    Dementia ([facility] [address]     Depression     History of TIAs 2013    Hyperlipidemia      History reviewed. No pertinent surgical history. Social History     Socioeconomic History    Marital status:      Spouse name: Not on file    Number of children: Not on file    Years of education: Not on file    Highest education level: Not on file   Occupational History    Not on file   Social Needs    Financial resource strain: Not on file    Food insecurity     Worry: Not on file     Inability: Not on file    Transportation needs     Medical: Not on file     Non-medical: Not on file   Tobacco Use    Smoking status: Current Every Day Smoker     Years: 46.00    Smokeless tobacco: Never Used   Substance and Sexual Activity    Alcohol use: No     Alcohol/week: 0.0 standard drinks    Drug use: Never    Sexual activity: Not on file   Lifestyle    Physical activity     Days per week: Not on file     Minutes per session: Not on file    Stress: Not on file   Relationships    Social connections     Talks on phone: Not on file     Gets together: Not on file     Attends Nondenominational service: Not on file     Active member of club or organization: Not on file     Attends meetings of clubs or organizations: Not on file     Relationship status: Not on file    Intimate partner violence     Fear of current or ex partner: Not on file     Emotionally abused: Not on file     Physically abused: Not on file     Forced sexual activity: Not on file   Other Topics Concern    Not on file   Social History Narrative    Not on file     Family History   Family history unknown:  Yes     Allergies   Allergen Reactions    Namenda [Memantine Hcl]      Hands turned purple and brown     Current Facility-Administered Medications   Medication Dose Route Frequency Provider Last Rate Last Dose    labetalol (NORMODYNE;TRANDATE) injection syringe 10 mg  10 mg Intravenous Q6H PRN Tiffany Hinojosa DO        sodium chloride [id number] 0480 66 01 75 08/21/2015     Lab Results   Component Value Date    TRIG 132 12/22/2015    HDL 67 12/22/2015    LDLCALC 79 12/22/2015     No results found for: Vivian Raring, LABBENZ, CANNAB, COCAINESCRN, LABMETH, OPIATESCREENURINE, PHENCYCLIDINESCREENURINE, PPXUR, ETOH  No results found for: LITHIUM, DILFRTOT, VALPROATE    Assessment:   Generalized seizures in a patient with severe vascular dementia. Patient has not any recent investigations as we have not seen her for a while. She was diagnosed a few years ago and it may be coming around 5 years. She had a reaction Namenda and when last seen she was on galantamine though nothing has helped her. Her  provides her full care and she is total care. This appears to be new onset seizure which is not uncommonly seen with vascular dementia's. She will continue on Keppra for now we will reinvestigate her with an MRI if possible and an EEG. Medical records from our office were obtained and reviewed. This  Is a delayed dictation due to obtaining medical records. This consultation includes my consultation with emergency room when she was admitted.       Plan:

## 2020-03-16 NOTE — PROCEDURES
[address]                      [facility], [address] [facility]                          ELECTROENCEPHALOGRAM REPORT    PATIENT NAME: Мария Serrato                      :        1955  MED REC NO:   [id number]                            ROOM:       N227  ACCOUNT NO:   [de-identified]                           ADMIT DATE: 2020  PROVIDER:     Darcy Marcelo MD    DATE OF EE/10/2020    EEG FINDINGS:  This is a spontaneous 21-channel EEG recording for this  patient with severe dementia. The patient presented with a seizure. The background rhythm of this EEG low amplitude tracings with a delta  activity seen for the EEG recording with some occasional 4 to 5 Hz  rhythms. The patient does have photic responses though. Hyperventilation is not performed due to the patient's inability to  follow commands. The record otherwise remains symmetrical throughout  without any asymmetries or epileptiform discharge. There is no session  of myoclonic jerks. IMPRESSION:  This is an abnormal EEG recording by the virtue of  significant diffuse slowing seen throughout the EEG recording consistent  with a diagnosis of degenerative etiology. Clinical course recommended.         Harlan Duque MD    D: 03/15/2020 16:30:11       T: 03/15/2020 21:59:19     [email or web address]  Job#: [id number]     Doc#: [id number]    CC:

## 2020-04-21 NOTE — PROGRESS NOTES
Gastrointestinal: Negative for vomiting. Skin: Negative for color change and rash. Neurological: Positive for speech difficulty and weakness. Negative for tremors, seizures, syncope and facial asymmetry. Psychiatric/Behavioral: Positive for confusion. Negative for agitation and hallucinations. The patient is not nervous/anxious. Objective:   LMP  (LMP Unknown)     Physical Exam  Deferred due to telephone encounter      Ct Head Wo Contrast    Result Date: 3/9/2020  CT HEAD WO CONTRAST CLINICAL HISTORY: Change in mental status COMPARISON: None TECHNIQUE: Multiple unenhanced serial axial images of the brain from the vertex of the skull to the base of the skull were performed. FINDINGS: The ventricles are dilated. This is compensatory to the surrounding moderate generalized parenchymal volume loss. No mass. No midline shift. The cisterns are patent. There are white matter and periventricular changes most likely consistent with chronic small vessel disease. No acute intra-axial or extra-axial findings. The visualized osseous structures are unremarkable. There is mucoperiosteal thickening of the right maxillary sinus. No air-fluid levels. The mastoids are well aerated. NO ACUTE INTRA-AXIAL OR EXTRA-AXIAL FINDINGS. All CT scans at this facility use dose modulation, iterative reconstruction, and/or weight based dosing when appropriate to reduce radiation dose to as low as reasonably achievable. Xr Chest Portable    Result Date: 3/10/2020  EXAMINATION: Portable AP ERECT view of the chest. CLINICAL HISTORY:  cardiopulmonary evaluation , patient had seizure today. DATE: 3/9/2020 11:00 AM COMPARISONS: None available. FINDINGS: Film(s) was obtained with a poor depth of inspiration. The heart is mildly to moderately enlarged. There are atherosclerotic calcifications in the aortic arch. The thoracic aorta is tortuous. The left costophrenic angle is blunted secondary to a pleural effusion.  Mild central

## 2020-06-02 NOTE — ED PROVIDER NOTES
PM      PATIENT REFERRED TO:  WARREN Wilcox NP  [facility] [id number]    Call in 1 day  For continued evaluation and management    Darius Gomez MD  [address]  [phone]    Call in 1 day  For continued evaluation and management      DISCHARGE MEDICATIONS:  New Prescriptions    No medications on file          (Please notethat portions of this note were completed with a voice recognition program.  Efforts were made to edit the dictations but occasionally words are mis-transcribed.)    WARREN Sandhu CNP (electronically signed)  Attending Emergency Physician         WARREN Sandhu CNP  06/02/20 [id number]

## 2020-06-15 PROBLEM — F01.518 VASCULAR DEMENTIA WITH BEHAVIOR DISTURBANCE: Status: ACTIVE | Noted: 2020-01-01

## 2020-06-15 PROBLEM — I69.30 CEREBRAL MULTI-INFARCT STATE: Status: ACTIVE | Noted: 2020-01-01

## 2020-06-15 NOTE — PROGRESS NOTES
as this is difficult to perform a review of system except from the hospital.    She has not any fevers falls injuries trauma chest pain shortness of breath diarrhea. She does have frequent urination. Past Medical History:   Diagnosis Date    Dementia ([facility] [address]     Depression     History of TIAs 2013    Hyperlipidemia     Seizure ([facility] [address]      No past surgical history on file. Social History     Socioeconomic History    Marital status:      Spouse name: Not on file    Number of children: Not on file    Years of education: Not on file    Highest education level: Not on file   Occupational History    Not on file   Social Needs    Financial resource strain: Not on file    Food insecurity     Worry: Not on file     Inability: Not on file    Transportation needs     Medical: Not on file     Non-medical: Not on file   Tobacco Use    Smoking status: Current Every Day Smoker     Years: 46.00    Smokeless tobacco: Never Used   Substance and Sexual Activity    Alcohol use: No     Alcohol/week: 0.0 standard drinks    Drug use: Never    Sexual activity: Not on file   Lifestyle    Physical activity     Days per week: Not on file     Minutes per session: Not on file    Stress: Not on file   Relationships    Social connections     Talks on phone: Not on file     Gets together: Not on file     Attends [employer] service: Not on file     Active member of club or organization: Not on file     Attends meetings of clubs or organizations: Not on file     Relationship status: Not on file    Intimate partner violence     Fear of current or ex partner: Not on file     Emotionally abused: Not on file     Physically abused: Not on file     Forced sexual activity: Not on file   Other Topics Concern    Not on file   Social History Narrative    Not on file     Family History   Family history unknown:  Yes     Allergies   Allergen Reactions    Namenda [Memantine Hcl]      Hands turned purple and brown       Current Results   Component Value Date    WBC 6.9 06/02/2020    RBC 4.83 06/02/2020    HGB 14.7 06/02/2020    HCT 43.6 06/02/2020    MCV 90.2 06/02/2020    MCH 30.4 06/02/2020    MCHC 33.7 06/02/2020    RDW 12.6 06/02/2020     06/02/2020    MPV 9.8 08/21/2015     Lab Results   Component Value Date     06/02/2020    K 3.6 06/02/2020     06/02/2020    CO2 26 06/02/2020    BUN 12 06/02/2020    CREATININE 0.78 06/02/2020    GFRAA >60.0 06/02/2020    LABGLOM >60.0 06/02/2020    GLUCOSE 96 06/02/2020    PROT 6.7 06/02/2020    LABALBU 4.0 06/02/2020    CALCIUM 9.1 06/02/2020    BILITOT 0.3 06/02/2020    ALKPHOS 83 06/02/2020    AST 16 06/02/2020    ALT 12 06/02/2020     No results found for: PROTIME, INR  Lab Results   Component Value Date    TSH 2.480 08/21/2015    [id number] 752 08/21/2015     Lab Results   Component Value Date    TRIG 132 12/22/2015    HDL 67 12/22/2015    LDLCALC 79 12/22/2015     No results found for: Carolina Aleksander, LABBENZ, CANNAB, COCAINESCRN, LABMETH, OPIATESCREENURINE, PHENCYCLIDINESCREENURINE, PPXUR, ETOH  No results found for: LITHIUM, DILFRTOT, VALPROATE    Assessment:       Diagnosis Orders   1. Cerebral multi-infarct state     2. Seizure ([facility] [address]     3. Vascular dementia with behavior disturbance ([facility] [address]     Severe vascular dementia we had seen her in the hospital 3 months ago with a generalized seizure. Her  is her main care provider patient cannot come to the office due to agitation and she will not wear a mask. We discussed these findings on the phone and there is a possibility that she may have a tract infection but again it has been tried in the past to bring her in for a hearing test and she cannot. We therefore will empirically treat this with Bactrim I recommended hydration. We will see how she does in the next week we will require blood tests and he is aware of this.     We have discontinued the Keppra and started on Depakote which may help her agitation and

## 2020-09-21 NOTE — TELEPHONE ENCOUNTER
Patients  is calling wanting home health care and wants to come in sooner then march to see you. He states that the papers that were all ready sent over are not valid. The home anne marie goes through [facility] and she will not be going to a nursing home.      Please advise if ok to do or call patient       [address] [facility] [place]

## 2020-09-22 NOTE — TELEPHONE ENCOUNTER
Home health care order was placed. You can schedule follow-up with them when it is convenient for them and the schedule will allow.

## 2020-09-23 NOTE — PROGRESS NOTES
Subjective:      Patient ID: Lisa Stephens is a 63 y.o. female who presents today for:  Chief Complaint   Patient presents with    Follow-up     patients  states that the keppra is working well. Aggitation is down, no recent seizures, her walking is seeming to slow down. Needs eval for [facility]. HPI  Pt seen and examined in the office for 3-month follow-up for advanced vascular dementia    9/23/20:  Patient seen and examined in the office accompanied by her  for 3-month follow-up for advanced vascular dementia with seizures. Patient alert, confused, mostly nonverbal.  Patient has difficulty following commands. Restless.  reports increasing difficulty caring for patient at home. He reports caregiver strain. He states patient has fallen 5 times in the last 3 months. She is easily agitated. P.o. intake fair. Patient with increasing difficulty with ambulation and ADLs. No recurrent seizures reported. 6/15/20:  Patient is at home I am in my office. Patient could not come to my office that she is very agitated. As noted history is only obtained from the . HPI 61-year-old right-handed female with history of severe vascular dementia. Patient was diagnosed at least 5 years ago. She had a reaction to Namenda. She was on galantamine and nothing has helped her. Her  is a full care provider and he noted that he she had a seizure. We started on Keppra and she had a downhill course she has become very agitated. She is much more worse in the last 3 weeks. On close questioning it does appear that she has foul smelling urine as well. We are not quite sure if she has a UTI but we were not able to bring her to the hospital.  Review of system is difficult to ascertain and obtained as this is difficult to perform a review of system except from the hospital.  She has not any fevers falls injuries trauma chest pain shortness of breath diarrhea.   She does have frequent urination.     20:  Pt was admitted to [facility] from 3/9/20 to 3/10/20 for New onset Generalized seizure . Hospital records reviewed. Pt with advanced vascular dementia. Seizure was thought to be secondary to this. Unable to do MRI due to pt unable to cooperate with test. Ct head no acute findings. Pt started on Keppra. Tolerated well. EEG done which showed diffuse slowing consistent with degenerative nature.  reports pt is at her baseline and doing well. No new seizure activity, Tolerating Keppra without any new agitation or behavioral issues. No new or acute neuro complaints/concerns. Past Medical History:   Diagnosis Date    Dementia ([facility] [address]     Depression     History of TIAs 2013    Hyperlipidemia     Seizure ([facility] [address]      No past surgical history on file.   Social History     Socioeconomic History    Marital status:      Spouse name: Not on file    Number of children: Not on file    Years of education: Not on file    Highest education level: Not on file   Occupational History    Not on file   Social Needs    Financial resource strain: Not on file    Food insecurity     Worry: Not on file     Inability: Not on file    Transportation needs     Medical: Not on file     Non-medical: Not on file   Tobacco Use    Smoking status: Former Smoker     Years: 46.00     Last attempt to quit: 3/23/2020     Years since quittin.5    Smokeless tobacco: Never Used   Substance and Sexual Activity    Alcohol use: No     Alcohol/week: 0.0 standard drinks    Drug use: Never    Sexual activity: Not on file   Lifestyle    Physical activity     Days per week: Not on file     Minutes per session: Not on file    Stress: Not on file   Relationships    Social connections     Talks on phone: Not on file     Gets together: Not on file     Attends Voodoo service: Not on file     Active member of club or organization: Not on file     Attends meetings of clubs or organizations: Not on file     Relationship status: Not on file    Intimate partner violence     Fear of current or ex partner: Not on file     Emotionally abused: Not on file     Physically abused: Not on file     Forced sexual activity: Not on file   Other Topics Concern    Not on file   Social History Narrative    Not on file     Family History   Family history unknown: Yes     Allergies   Allergen Reactions    Namenda [Memantine Hcl]      Hands turned purple and brown     Current Outpatient Medications on File Prior to Visit   Medication Sig Dispense Refill    levETIRAcetam (KEPPRA) 500 MG tablet Take 1 tablet by mouth 2 times daily 60 tablet 3    nystatin (MYCOSTATIN) 846687 UNIT/GM powder Apply 3 times daily. 1 Bottle 0    aspirin 81 MG tablet Take 81 mg by mouth daily      polyethylene glycol (GLYCOLAX) packet Take 17 g by mouth daily       No current facility-administered medications on file prior to visit. Review of Systems   Unable to perform ROS: Dementia   Constitutional: Positive for appetite change. HENT: Negative for trouble swallowing. Respiratory: Negative for cough and wheezing. Cardiovascular: Negative for leg swelling. Musculoskeletal: Positive for gait problem. Neurological: Positive for speech difficulty and weakness. Negative for tremors, seizures and facial asymmetry. Psychiatric/Behavioral: Positive for behavioral problems, confusion and sleep disturbance. Objective:   BP (!) 141/116 (Site: Left Upper Arm, Position: Sitting, Cuff Size: Large Adult)   Pulse 93   LMP  (LMP Unknown)     Physical Exam  Vitals signs reviewed. Constitutional:       General: She is not in acute distress. Appearance: She is not diaphoretic. HENT:      Head: Normocephalic and atraumatic. Eyes:      Pupils: Pupils are equal, round, and reactive to light. Cardiovascular:      Rate and Rhythm: Normal rate and regular rhythm.    Pulmonary:      Effort: Pulmonary effort is normal. No respiratory distress. Breath sounds: Normal breath sounds. Skin:     General: Skin is warm and dry. Neurological:      General: No focal deficit present. Mental Status: She is alert. She is disoriented. Cranial Nerves: No cranial nerve deficit. Motor: No tremor or seizure activity. Ct Head Wo Contrast    Result Date: 6/2/2020  EXAM: CT SCAN OF THE BRAIN WITHOUT CONTRAST COMPARISON: 3/9/2020 REASONS FOR EXAMINATION:     SEIZURE WITH VOMITING, EVALUATE FOR POSSIBLE INTRACRANIAL HEMORRHAGE TECHNIQUE:  CT brain is obtained without IV contrast agent. FINDINGS: An unenhanced CT scan of the brain demonstrates no evidence of a skull fracture. There is cerebral atrophy with ventriculomegaly and age related findings in the brain. Patchy white matter hypoattenuation is likely a sequela of small vessel disease. There is no acute CVA. There is no acute intra-axial or extra-axial findings in the brain. There is no intracranial mass, hemorrhage, \"mass effect\" or midline shift. There is a small polyp or mucous tension cyst within the right maxillary sinus. 1. CEREBRAL ATROPHY AND AGE RELATED FINDINGS IN THE BRAIN. 2. NO ACUTE INTRA-AXIAL OR EXTRA-AXIAL FINDINGS IN THE BRAIN. 3. NO SIGNIFICANT INTERVAL CHANGE SINCE THE 3/9/2020 CT BRAIN. All CT scans at this facility use dose modulation, iterative reconstruction, and/or weight based dosing when appropriate to reduce radiation dose to as low as reasonably achievable.        Lab Results   Component Value Date    WBC 6.9 06/02/2020    RBC 4.83 06/02/2020    HGB 14.7 06/02/2020    HCT 43.6 06/02/2020    MCV 90.2 06/02/2020    MCH 30.4 06/02/2020    MCHC 33.7 06/02/2020    RDW 12.6 06/02/2020     06/02/2020    MPV 9.8 08/21/2015     Lab Results   Component Value Date     06/02/2020    K 3.6 06/02/2020     06/02/2020    CO2 26 06/02/2020    BUN 12 06/02/2020    CREATININE 0.78 06/02/2020    GFRAA >60.0 06/02/2020    LABGLOM >60.0 06/02/2020    GLUCOSE 96 06/02/2020    PROT 6.7 06/02/2020    LABALBU 4.0 06/02/2020    CALCIUM 9.1 06/02/2020    BILITOT 0.3 06/02/2020    ALKPHOS 83 06/02/2020    AST 16 06/02/2020    ALT 12 06/02/2020     No results found for: PROTIME, INR  Lab Results   Component Value Date    TSH 2.480 08/21/2015    [id number] 752 08/21/2015     Lab Results   Component Value Date    TRIG 132 12/22/2015    HDL 67 12/22/2015    LDLCALC 79 12/22/2015     No results found for: Gearlean Kennard, LABBENZ, CANNAB, COCAINESCRN, LABMETH, OPIATESCREENURINE, PHENCYCLIDINESCREENURINE, PPXUR, ETOH  No results found for: LITHIUM, DILFRTOT, VALPROATE    Assessment and Plan:      1. Vascular dementia with behavior disturbance ([facility] [address]  2. Seizure ([facility] [address]  -Dementia is advanced  -Attempted to take patient off Keppra and initiate Depakote due to behavioral disturbances but patient had reaction to Depakote and  wished to keep her on Keppra.  -No recurrent seizure activity  -Patient with recurrent falls and continued functional and cognitive decline  -Has been expressing frustration and difficulty caring for patient and concern over safety. He is tearful at today's visit. We discussed option of extended care facility and he is likely to pursue this option for patient's care. emotional support provided. Return in about 6 months (around 3/23/2021), or if symptoms worsen or fail to improve.     Savita Momin, APRN - CNP

## 2020-10-06 NOTE — ED PROVIDER NOTES
diarrhea, nausea and vomiting. Endocrine: Negative for polydipsia and polyuria. Genitourinary: Negative for decreased urine volume, dysuria, flank pain and hematuria. Musculoskeletal: Negative for arthralgias, back pain and myalgias. Skin: Negative for color change, rash and wound. Neurological: Negative for dizziness, syncope, weakness, light-headedness and headaches. Psychiatric/Behavioral: Positive for agitation, behavioral problems, confusion and sleep disturbance. The patient is nervous/anxious. All other systems reviewed and are negative. Except as noted above the remainder of the review of systems was reviewed and negative. PAST MEDICAL HISTORY     Past Medical History:   Diagnosis Date    Dementia ([facility] [address] [facility]     Depression     History of TIAs 2013    Hyperlipidemia     [facility]      History reviewed. No pertinent surgical history.   Social History     Socioeconomic History    Marital status:      Spouse name: None    Number of children: None    Years of education: None    Highest education level: None   Occupational History    None   Social Needs    Financial resource strain: None    Food insecurity     Worry: None     Inability: None    Transportation needs     Medical: None     Non-medical: None   Tobacco Use    Smoking status: Former Smoker     Years: 46.00     Last attempt to quit: 3/23/2020     Years since quittin.5    Smokeless tobacco: Never Used   Substance and Sexual Activity    Alcohol use: No     Alcohol/week: 0.0 standard drinks    Drug use: Never    Sexual activity: None   Lifestyle    Physical activity     Days per week: None     Minutes per session: None    Stress: None   Relationships    Social connections     Talks on phone: None     Gets together: None     Attends Restorationism service: None     Active member of club or organization: None     Attends meetings of clubs or organizations: None     Relationship status: None    Intimate Memory: Cognition is impaired. Memory is impaired. Judgment: Judgment is impulsive. RESULTS     EKG: All EKG's are interpreted by the Emergency Department Physician who either signs or Co-signsthis chart in the absence of a cardiologist.        RADIOLOGY:   Bertram Shoulder such as CT, Ultrasound and MRI are read by the radiologist. Vermell Gum radiographic images are visualized and preliminarily interpreted by the emergency physician with the below findings:        Interpretation per the Radiologist below, if available at the time ofthis note:    CT Head WO Contrast   Final Result   Impression:      Moderate cerebral atrophy, unchanged. Chronic ischemic white matter disease. Right maxillary retention cyst.         All CT scans at this facility use dose modulation, iterative reconstruction, and/or weight based dosing when appropriate to reduce radiation dose to as low as reasonably achievable. ED BEDSIDE ULTRASOUND:   Performed by ED Physician - none    LABS:  Labs Reviewed   COMPREHENSIVE METABOLIC PANEL   CBC WITH AUTO DIFFERENTIAL   URINE RT REFLEX TO CULTURE       All other labs were within normal range or not returned as of this dictation. EMERGENCY DEPARTMENT COURSE and DIFFERENTIAL DIAGNOSIS/MDM:   Vitals:    Vitals:    10/06/20 1137 10/06/20 1324   BP: (!) 177/128 (!) 168/1   Pulse: 98 88   Resp: 17    Temp: 97.5 °F (36.4 °C)    TempSrc: Temporal    SpO2: 96% 98%   Weight: 240 lb (108.9 kg)    Height: 5' 6\" (1.676 m)             MDM   Patient is a 68-year-old female presented to the ER with a chief complaint of agitation increasing within the past 2 weeks. Patient is hemodynamically stable nontoxic-appearing. Patient's lab work is unremarkable. Scan of the head is negative.   Patient's  wants to take patient home with treatment for UTI before patient urinating for sample because multiple attempts have been implanted and patient has still not been able to urinate. Patient is given a prescription for Bactrim and Pyridium.  is instructed to follow-up with Dr. Danya Waterman and primary care doctor. Patient discharged in stable condition with stable vitals. CRITICAL CARE TIME       CONSULTS:  None    PROCEDURES:  Unless otherwise noted below, none     Procedures    FINAL IMPRESSION      1. Agitation    2.  Dementia with behavioral disturbance, unspecified dementia type [facility])          DISPOSITION/PLAN   DISPOSITION Decision To Discharge 10/06/2020 02:58:19 PM      PATIENT REFERRED TO:  WARREN Palomares NP  [address]  [phone]    Schedule an appointment as soon as possible for a visit in 1 day      Stevie Orellana MD  [address] [place] [address]  [phone]    Schedule an appointment as soon as possible for a visit in 1 day        DISCHARGE MEDICATIONS:  New Prescriptions    PHENAZOPYRIDINE (PYRIDIUM) 200 MG TABLET    Take 1 tablet by mouth 3 times daily as needed for Pain    SULFAMETHOXAZOLE-TRIMETHOPRIM (BACTRIM DS;SEPTRA DS) 800-160 MG PER TABLET    Take 1 tablet by mouth 2 times daily for 7 days          (Please notethat portions of this note were completed with a voice recognition program.  Efforts were made to edit the dictations but occasionally words are mis-transcribed.)    WARREN Glass CNP (electronically signed)  Attending Emergency Physician          WARREN Mora CNP  10/06/20 9797

## 2020-10-06 NOTE — TELEPHONE ENCOUNTER
Spoke with patient's  who reports concern over the fact that patient had a significant change over the last several days with increasing behavioral disturbances, increasing bowel incontinence. He was advised to bring her to the emergency room for evaluation for urinary tract infection and further work-up. Patient may need to be switched off Keppra to different anti-elliptic. We had recommended this in the past and patient had tried Depakote and was not able to tolerate it.  wished to keep her on Keppra at that time.

## 2020-10-06 NOTE — ED NOTES
Pt very disagreeable to all care. Assisted by Roman Dove with straight cath. Pt grabbing at staff. Candida amt of urine collected and sent to lab. Specimen inadequate.  Will need to re collected     Ezekiel Morfin RN  10/06/20 Grant Jade [address]  10/06/20 7024

## 2020-10-06 NOTE — TELEPHONE ENCOUNTER
Pt  states her symptoms have worsened she is having a hard time getting in and out of chairs bed her, agitation is much worse and her toilet habits are decreases she is having many accidents, many of the \"norms\" of their daily routine are disappearing

## 2020-10-06 NOTE — ED TRIAGE NOTES
Pt to ed from home via triage with spouse under advisement from NP for Dr Donna Ocampo. Per spouse pt is \"late stage dementia\" and they are attempting to place pt in NH. Pt refusing care. Took multople attemtps to get pt in room and assess vital signs. Pt skin pale, WDI. Amb with steady gait. Resps even and unlabored. No s.s of distress.

## 2020-10-06 NOTE — ED TRIAGE NOTES
Pt presents to ED from home with  with c/o agitation. Per pt's , pt is in the \"final stage\" of dementia and has been increasingly agitated and confused over the last few weeks. Pt's  was advised by Dr. Ana Cruz office to bring pt in for evaluation. Per pt's  he is in the process of getting pt placed at [facility]. Pt's  also states that pt has recently become incontinent of stool and urine. Upon assessment, pt is A/Ox1, skin p/w/d, resp even and unlabored, msp's intact.

## 2020-10-07 NOTE — TELEPHONE ENCOUNTER
Tate November from [facility] called in regards to Leandro and her  reaching out to them to try to get placement for her. She states that if you guys are comfortable with writing the order for this they would need it to state that the patient would benefit to be placed at [facility] from the community for long term care. They need a copy of her updated medications and HNP. She stated that the order could come from either of you.

## 2020-11-09 NOTE — TELEPHONE ENCOUNTER
Spoke with . He states patient is in the process of getting admitted to extended care facility. He declines any medications at this time for treatment of her behaviors as she has had severe reactions to medications in the past.  He is requesting to speak with office staff regarding release of medical records. Will forward this message to office staff.

## 2020-11-18 NOTE — TELEPHONE ENCOUNTER
Called and spoke with  Wilver Vivas who reports he went into the facility and sat with patient and she called down. He reports that provider there is going to give her Ativan. He is attempting to get her placed into a memory care unit that is specific for treatment of dementia patients once her medic care is approved.

## 2020-11-18 NOTE — TELEPHONE ENCOUNTER
Patient's  called, stated he was called by the staff of her care facility that she has become combative and abusive and he has been asked to come to the facility. He wants you to call him to discuss her behavior.     Thanks  Raul Martinez

## 2020-12-15 NOTE — TELEPHONE ENCOUNTER
Patients  called to give you an update. She is now in a memory care facility and was seen by Neurologist there. They will be stopping Keppra and changing to Dilantin, he did not know the dosage. She was also recommended to have consultation with psych.     Thanks  Olman Lezama